# Patient Record
Sex: MALE | Employment: FULL TIME | URBAN - METROPOLITAN AREA
[De-identification: names, ages, dates, MRNs, and addresses within clinical notes are randomized per-mention and may not be internally consistent; named-entity substitution may affect disease eponyms.]

---

## 2018-07-03 PROCEDURE — 88262 CHROMOSOME ANALYSIS 15-20: CPT | Performed by: OBSTETRICS & GYNECOLOGY

## 2018-07-03 PROCEDURE — 36415 COLL VENOUS BLD VENIPUNCTURE: CPT | Performed by: OBSTETRICS & GYNECOLOGY

## 2018-07-03 PROCEDURE — 88237 TISSUE CULTURE BONE MARROW: CPT | Performed by: OBSTETRICS & GYNECOLOGY

## 2018-07-03 PROCEDURE — 81229 CYTOG ALYS CHRML ABNR SNPCGH: CPT | Performed by: OBSTETRICS & GYNECOLOGY

## 2018-07-30 ENCOUNTER — GENETICS ENCOUNTER (OUTPATIENT)
Dept: GENETICS | Facility: HOSPITAL | Age: 43
End: 2018-07-30
Attending: GENETIC COUNSELOR, MS
Payer: COMMERCIAL

## 2018-07-30 PROCEDURE — 96040 HC GENETIC COUNSELING EA 30 MIN: CPT | Performed by: GENETIC COUNSELOR, MS

## 2018-07-30 NOTE — PROGRESS NOTES
Referring Provider:      Antonio Vanessa DO     Reason for Referral:  Willam Lee was referred for genetic counseling because of abnormal cytogenomic SNP microarray results. Mr. Kesli Beckman is a 41year-old man of [place] [employer] [facility] [place] descent.   His wife, Va to not have this microdeletion. We discussed that it is not possible to predict if the microdeletion will result in intellectual or physical abnormalities in Mr. Ji’s children. I do not think that this is the cause of the couple’s pregnancy losses.   [facility] screening for cystic fibrosis (CF) and spinal muscular atrophy (SMA) (Committee Opinion Summary No. 690, Vol. 129, No. 3, March 2017).   ACOG also recommends that all patients who are considering a pregnancy or already pregnant be screening for thalassemias microdeletion will have on Mr. Ji’s children. If the couple are interested in learning more about PGT I recommend that they meet with a genetic counselor at [facility].   Alternatively, if the couple decide to conceive naturally, t